# Patient Record
Sex: FEMALE | Race: WHITE | HISPANIC OR LATINO | Employment: FULL TIME | URBAN - METROPOLITAN AREA
[De-identification: names, ages, dates, MRNs, and addresses within clinical notes are randomized per-mention and may not be internally consistent; named-entity substitution may affect disease eponyms.]

---

## 2022-08-09 ENCOUNTER — OFFICE VISIT (OUTPATIENT)
Dept: OBGYN CLINIC | Facility: CLINIC | Age: 27
End: 2022-08-09
Payer: COMMERCIAL

## 2022-08-09 VITALS
DIASTOLIC BLOOD PRESSURE: 78 MMHG | SYSTOLIC BLOOD PRESSURE: 128 MMHG | BODY MASS INDEX: 25.61 KG/M2 | WEIGHT: 150 LBS | HEIGHT: 64 IN

## 2022-08-09 DIAGNOSIS — R11.0 NAUSEA: ICD-10-CM

## 2022-08-09 DIAGNOSIS — N91.2 AMENORRHEA: Primary | ICD-10-CM

## 2022-08-09 LAB — SL AMB POCT URINE HCG: POSITIVE

## 2022-08-09 PROCEDURE — 76801 OB US < 14 WKS SINGLE FETUS: CPT | Performed by: PHYSICIAN ASSISTANT

## 2022-08-09 PROCEDURE — 81025 URINE PREGNANCY TEST: CPT | Performed by: PHYSICIAN ASSISTANT

## 2022-08-09 PROCEDURE — 99204 OFFICE O/P NEW MOD 45 MIN: CPT | Performed by: PHYSICIAN ASSISTANT

## 2022-08-09 RX ORDER — ONDANSETRON 8 MG/1
8 TABLET, ORALLY DISINTEGRATING ORAL EVERY 8 HOURS PRN
Qty: 20 TABLET | Refills: 2 | Status: SHIPPED | OUTPATIENT
Start: 2022-08-09 | End: 2022-10-13

## 2022-08-09 NOTE — PROGRESS NOTES
Assessment/Plan:  - Viable IUP @ 8w6d today  - ALBERTO 23  - Continue PNV  - Call for concerns  - RTO 2 weeks for OB intake     Diagnoses and all orders for this visit:    Amenorrhea  -     POCT urine HCG  -     AMB US OB < 14 weeks single or first gestation level 1  -     Ambulatory Referral to Maternal Fetal Medicine; Future    Nausea  -     ondansetron (Zofran ODT) 8 mg disintegrating tablet; Take 1 tablet (8 mg total) by mouth every 8 (eight) hours as needed for nausea or vomiting          Subjective:      Patient ID: Amanda Marie is a 28 y o  female  Castillo Serna is a 26YO  Wf presenting to the office for pregnancy confirmation via [address]  She reports her LMP as 22, placing her at [address] today with an ALBERTO of 23  She is having nausea and has been vomiting  The following portions of the patient's history were reviewed and updated as appropriate: allergies, current medications, past family history, past medical history, past social history, past surgical history and problem list     Review of Systems   Constitutional: Negative for chills, fever and unexpected weight change  Respiratory: Negative for shortness of breath  Cardiovascular: Negative for chest pain  Gastrointestinal: Positive for nausea and vomiting  Negative for abdominal pain and diarrhea  Skin: Negative for rash  Psychiatric/Behavioral: Negative for dysphoric mood  The patient is not nervous/anxious  Objective:      /78 (BP Location: Left arm, Patient Position: Sitting, Cuff Size: Standard)   Ht 5' 4" (1 626 m)   Wt 68 kg (150 lb)   LMP 2022 (Exact Date)   BMI 25 75 kg/m²          Physical Exam  Vitals reviewed  Constitutional:       Appearance: Normal appearance  She is normal weight  HENT:      Head: Normocephalic and atraumatic  Pulmonary:      Effort: Pulmonary effort is normal    Genitourinary:     General: Normal vulva  Labia:         Right: No rash or lesion           Left: No rash or lesion  Comments: TVUS reveals IUP, yolk sac, fetal pole, +CM  CRL 2 17cm (8w6d)  FHR 187bpm  ALBERTO 03-18-23  Skin:     General: Skin is warm and dry  Neurological:      General: No focal deficit present  Mental Status: She is alert  Psychiatric:         Mood and Affect: Mood normal          Behavior: Behavior normal          Ultrasound Probe Disinfection    A transvaginal ultrasound was performed     Prior to use, disinfection was performed with High Level Disinfection Process (Trophon)  Probe serial number [id number]: [id number] was used    Ghulam Oakley PA-C  08/09/22  6:37 PM

## 2022-08-22 NOTE — PROGRESS NOTES
OB INTAKE INTERVIEW  Pt presents for OB intake  The patient was oriented to our practice and all questions were answered  Plan:  - Prenatal labs ordered  - Referral given for MFM at confirmation scan  - Reviewed Genetic testing options   CF:   SMA:   - Patient to call for concerns  - RTO 3-4 weeks for OB F/U visit and PAP/Cultures    ~Last pap: 2022 (Done w/ previous Gyn in [place], declines hx of abnormals)       OB History        1    Para        Term                AB        Living           SAB        IAB        Ectopic        Multiple        Live Births               Obstetric Comments   Menarche 13                 Last Menstrual Period:             22      Ultrasound date:    22  Estimated Date of Delivery:  3/18/23     Current Issues:  Constipation :          yes       Headaches :            occ  Cramping:                no  Spotting :                  no     Interview education  · [facility] Pregnancy Essentials reviewed and discussed   · Baby and [address] Handout  · [facility] MFM Handouts  · Discussed genetic testing  · Prenatal lab work: Scripts printed and given to pt         Prior Pregnancy Delivery Complications              History of  delivery or PPROM:   History of Shoulder Dystocia:               History of vacuum or forceps delivery:               History of 3rd/4th degree laceration:               History of  section:      Diabetes              Pregestational DM:               hx of GDM:               BMI >35:               first degree relative with type 2 diabetes:               hx of PCOS:               current metformin use:               prior hx of LGA/macrosomia:                Hypertension              Hx of chronic HTN:               hx of gestational HTN:               hx of preeclampsia, eclampsia, or HELLP syndrome:               Age 28 or older:               Multifetal gestation:  Type 1 or Type 2 DM:  Renal Disease:   Autoimmune disease (systemic lupus erythematosus, antiphospholipid antibody syndrome):   Nulliparity:   Obesity (BMI over 30):   More than 10 year pregnancy interval:   Previous IUGR, low birthweight or small for gestational age:     Immunizations:                influenza vaccine: No              Covid Vaccination: vaccinated  Discussed Tdap vaccine administration at 27-28 weeks                   There is no immunization history on file for this patient           Dental visit with last 6 months: No  PHQ-2/9 score: 0  MyChart activated (not 1418 years of age)?: Yes

## 2022-08-24 ENCOUNTER — INITIAL PRENATAL (OUTPATIENT)
Dept: OBGYN CLINIC | Facility: CLINIC | Age: 27
End: 2022-08-24

## 2022-08-24 VITALS — WEIGHT: 153.2 LBS | BODY MASS INDEX: 26.3 KG/M2

## 2022-08-24 DIAGNOSIS — Z34.01 ENCOUNTER FOR SUPERVISION OF NORMAL FIRST PREGNANCY IN FIRST TRIMESTER: Primary | ICD-10-CM

## 2022-08-24 PROCEDURE — OBC: Performed by: OBSTETRICS & GYNECOLOGY

## 2022-09-08 ENCOUNTER — LAB (OUTPATIENT)
Dept: LAB | Facility: CLINIC | Age: 27
End: 2022-09-08
Payer: COMMERCIAL

## 2022-09-08 ENCOUNTER — ROUTINE PRENATAL (OUTPATIENT)
Dept: PERINATAL CARE | Facility: OTHER | Age: 27
End: 2022-09-08
Payer: COMMERCIAL

## 2022-09-08 VITALS
HEIGHT: 64 IN | WEIGHT: 157.63 LBS | DIASTOLIC BLOOD PRESSURE: 72 MMHG | HEART RATE: 90 BPM | BODY MASS INDEX: 26.91 KG/M2 | SYSTOLIC BLOOD PRESSURE: 128 MMHG

## 2022-09-08 DIAGNOSIS — Z34.01 ENCOUNTER FOR SUPERVISION OF NORMAL FIRST PREGNANCY IN FIRST TRIMESTER: ICD-10-CM

## 2022-09-08 DIAGNOSIS — O34.81 OVARIAN CYST COMPLICATING PREGNANCY, ANTEPARTUM, FIRST TRIMESTER: ICD-10-CM

## 2022-09-08 DIAGNOSIS — Z36.9 UNSPECIFIED ANTENATAL SCREENING: ICD-10-CM

## 2022-09-08 DIAGNOSIS — Z33.1 PREGNANT STATE, INCIDENTAL: ICD-10-CM

## 2022-09-08 DIAGNOSIS — Z3A.12 12 WEEKS GESTATION OF PREGNANCY: ICD-10-CM

## 2022-09-08 DIAGNOSIS — Z36.82 ENCOUNTER FOR ANTENATAL SCREENING FOR NUCHAL TRANSLUCENCY: Primary | ICD-10-CM

## 2022-09-08 DIAGNOSIS — N83.209 OVARIAN CYST COMPLICATING PREGNANCY, ANTEPARTUM, FIRST TRIMESTER: ICD-10-CM

## 2022-09-08 LAB
ABO GROUP BLD: NORMAL
BASOPHILS # BLD AUTO: 0.04 THOUSANDS/ΜL (ref 0–0.1)
BASOPHILS NFR BLD AUTO: 0 % (ref 0–1)
BLD GP AB SCN SERPL QL: NEGATIVE
EOSINOPHIL # BLD AUTO: 0.02 THOUSAND/ΜL (ref 0–0.61)
EOSINOPHIL NFR BLD AUTO: 0 % (ref 0–6)
ERYTHROCYTE [DISTWIDTH] IN BLOOD BY AUTOMATED COUNT: 12.5 % (ref 11.6–15.1)
HBV SURFACE AG SER QL: NORMAL
HCT VFR BLD AUTO: 38 % (ref 34.8–46.1)
HCV AB SER QL: NORMAL
HGB BLD-MCNC: 13.2 G/DL (ref 11.5–15.4)
IMM GRANULOCYTES # BLD AUTO: 0.03 THOUSAND/UL (ref 0–0.2)
IMM GRANULOCYTES NFR BLD AUTO: 0 % (ref 0–2)
LYMPHOCYTES # BLD AUTO: 1.22 THOUSANDS/ΜL (ref 0.6–4.47)
LYMPHOCYTES NFR BLD AUTO: 13 % (ref 14–44)
MCH RBC QN AUTO: 30.1 PG (ref 26.8–34.3)
MCHC RBC AUTO-ENTMCNC: 34.7 G/DL (ref 31.4–37.4)
MCV RBC AUTO: 87 FL (ref 82–98)
MONOCYTES # BLD AUTO: 0.61 THOUSAND/ΜL (ref 0.17–1.22)
MONOCYTES NFR BLD AUTO: 6 % (ref 4–12)
NEUTROPHILS # BLD AUTO: 7.73 THOUSANDS/ΜL (ref 1.85–7.62)
NEUTS SEG NFR BLD AUTO: 81 % (ref 43–75)
NRBC BLD AUTO-RTO: 0 /100 WBCS
PLATELET # BLD AUTO: 271 THOUSANDS/UL (ref 149–390)
PMV BLD AUTO: 10.6 FL (ref 8.9–12.7)
RBC # BLD AUTO: 4.38 MILLION/UL (ref 3.81–5.12)
RH BLD: POSITIVE
RUBV IGG SERPL IA-ACNC: >175 IU/ML
SPECIMEN EXPIRATION DATE: NORMAL
WBC # BLD AUTO: 9.65 THOUSAND/UL (ref 4.31–10.16)

## 2022-09-08 PROCEDURE — 76813 OB US NUCHAL MEAS 1 GEST: CPT | Performed by: OBSTETRICS & GYNECOLOGY

## 2022-09-08 PROCEDURE — 86803 HEPATITIS C AB TEST: CPT

## 2022-09-08 PROCEDURE — 99203 OFFICE O/P NEW LOW 30 MIN: CPT | Performed by: OBSTETRICS & GYNECOLOGY

## 2022-09-08 PROCEDURE — 86787 VARICELLA-ZOSTER ANTIBODY: CPT

## 2022-09-08 PROCEDURE — 76801 OB US < 14 WKS SINGLE FETUS: CPT | Performed by: OBSTETRICS & GYNECOLOGY

## 2022-09-08 PROCEDURE — 80081 OBSTETRIC PANEL INC HIV TSTG: CPT

## 2022-09-08 PROCEDURE — 36415 COLL VENOUS BLD VENIPUNCTURE: CPT

## 2022-09-08 PROCEDURE — 87086 URINE CULTURE/COLONY COUNT: CPT

## 2022-09-08 NOTE — LETTER
September 8, 2022     Efrain Adams    Patient: Gail Savage   YOB: 1995   Date of Visit: 9/8/2022       Dear Dr Tito Hernandez: Thank you for referring Gail Savage to me for evaluation  Below are my notes for this consultation  If you have questions, please do not hesitate to call me  I look forward to following your patient along with you  Sincerely,        Cain Goldmann, MD        CC: No Recipients  Cain Goldmann, MD  9/7/2022  5:44 PM  Sign when Signing Visit  Please refer to the [facility] ultrasound report in Ob Procedures for additional information regarding today's visit

## 2022-09-08 NOTE — PROGRESS NOTES
Patient chose to have [facility] Non-invasive Prenatal Screen  Patient given brochure and is aware [facility] will contact patients insurance and coordinate coverage  Patient made aware she will need to respond to text message or e-mail from [employer] within 2 business days or testing will be run through insurance  Patient informed text message will come from area code  "415"  Provided [address] [place] [address] Street # [phone] and web site : [email or web address]  Pt will be going to [facility] lab for blood work to be completed   Patient was provided with an [facility] kit to have blood work drawn at any laboratory  Test tubes labeled with patient identifiers (name and date of birth)  Order and test tubes were verified with patient  [facility] packing instructions provided inside kit for when blood is drawn  Copy of lab order scanned to Epic media  Maternal Fetal Medicine will have results in approximately 7-10 business days and will call patient or notify via [address]  Patient aware viewing lab result online will reveal fetal sex If ordered  Patient verbalized understanding of all instructions and no questions at this time

## 2022-09-09 LAB
HIV 1+2 AB+HIV1 P24 AG SERPL QL IA: NORMAL
MISCELLANEOUS LAB TEST RESULT: NORMAL
RPR SER QL: NORMAL
VZV IGG SER QL IA: NORMAL

## 2022-09-11 LAB — BACTERIA UR CULT: NORMAL

## 2022-09-14 ENCOUNTER — ROUTINE PRENATAL (OUTPATIENT)
Dept: OBGYN CLINIC | Facility: CLINIC | Age: 27
End: 2022-09-14

## 2022-09-14 VITALS — DIASTOLIC BLOOD PRESSURE: 68 MMHG | BODY MASS INDEX: 26.81 KG/M2 | SYSTOLIC BLOOD PRESSURE: 122 MMHG | WEIGHT: 156.2 LBS

## 2022-09-14 DIAGNOSIS — Z34.01 ENCOUNTER FOR SUPERVISION OF NORMAL FIRST PREGNANCY IN FIRST TRIMESTER: Primary | ICD-10-CM

## 2022-09-14 PROCEDURE — 87491 CHLMYD TRACH DNA AMP PROBE: CPT | Performed by: OBSTETRICS & GYNECOLOGY

## 2022-09-14 PROCEDURE — 87591 N.GONORRHOEAE DNA AMP PROB: CPT | Performed by: OBSTETRICS & GYNECOLOGY

## 2022-09-14 PROCEDURE — PNV: Performed by: OBSTETRICS & GYNECOLOGY

## 2022-09-14 NOTE — PROGRESS NOTES
28 y o   female at 13w4d (Estimated Date of Delivery: 3/18/23) for PNV  Pre-Donnie Vitals    Flowsheet Row Most Recent Value   Prenatal Assessment    Prenatal Vitals    Blood Pressure 122/68   Weight - Scale 70 9 kg (156 lb 3 2 oz)   Urine Albumin/Glucose    Dilation/Effacement/Station    Vaginal Drainage    Edema          kg (6 lb 3 2 oz)  Reports having pap last year, will sign records release [place]  Completed labs, reviewed with patient  Normal NIPT    Planning gender reveal   Simple left ovarian cyst 2-3cm, discussed  Scheduled for Level II u/s  Ordered msAFP

## 2022-09-15 LAB
C TRACH DNA SPEC QL NAA+PROBE: NEGATIVE
N GONORRHOEA DNA SPEC QL NAA+PROBE: NEGATIVE

## 2022-10-07 ENCOUNTER — APPOINTMENT (OUTPATIENT)
Dept: LAB | Facility: CLINIC | Age: 27
End: 2022-10-07
Payer: COMMERCIAL

## 2022-10-07 DIAGNOSIS — Z34.01 ENCOUNTER FOR SUPERVISION OF NORMAL FIRST PREGNANCY IN FIRST TRIMESTER: ICD-10-CM

## 2022-10-07 PROCEDURE — 36415 COLL VENOUS BLD VENIPUNCTURE: CPT

## 2022-10-07 PROCEDURE — 82105 ALPHA-FETOPROTEIN SERUM: CPT

## 2022-10-10 LAB
2ND TRIMESTER 4 SCREEN SERPL-IMP: NORMAL
AFP ADJ MOM SERPL: 1.04
AFP INTERP AMN-IMP: NORMAL
AFP INTERP SERPL-IMP: NORMAL
AFP INTERP SERPL-IMP: NORMAL
AFP SERPL-MCNC: 40 NG/ML
AGE AT DELIVERY: 28.7 YR
GA METHOD: NORMAL
GA: 16.9 WEEKS
IDDM PATIENT QL: NO
MULTIPLE PREGNANCY: NO
NEURAL TUBE DEFECT RISK FETUS: NORMAL %

## 2022-10-13 ENCOUNTER — ROUTINE PRENATAL (OUTPATIENT)
Dept: OBGYN CLINIC | Facility: CLINIC | Age: 27
End: 2022-10-13

## 2022-10-13 VITALS — DIASTOLIC BLOOD PRESSURE: 58 MMHG | SYSTOLIC BLOOD PRESSURE: 132 MMHG | WEIGHT: 164 LBS | BODY MASS INDEX: 28.15 KG/M2

## 2022-10-13 DIAGNOSIS — Z3A.17 17 WEEKS GESTATION OF PREGNANCY: ICD-10-CM

## 2022-10-13 DIAGNOSIS — Z34.02 ENCOUNTER FOR SUPERVISION OF NORMAL FIRST PREGNANCY IN SECOND TRIMESTER: Primary | ICD-10-CM

## 2022-10-13 PROBLEM — Z34.90 SUPERVISION OF NORMAL PREGNANCY: Status: ACTIVE | Noted: 2022-10-13

## 2022-10-13 PROCEDURE — PNV: Performed by: OBSTETRICS & GYNECOLOGY

## 2022-10-13 NOTE — PROGRESS NOTES
This is a 28 y o   at 17w5d who presents for return OB visit  No complaints  Nausea improving though still sometimes present  BP: 132/58 TWlb    Normal AFP   Level 2 scheduled  F/up 4 wks

## 2022-11-01 NOTE — PATIENT INSTRUCTIONS
Thank you for choosing us for your  care today  If you have any questions about your ultrasound or care, please do not hesitate to contact us or your primary obstetrician  Some general instructions for your pregnancy are:    Protect against coronavirus: get vaccinated - pregnant women are increased risk of severe COVID  Notify your primary care doctor if you have any symptoms  Exercise: Aim for 22 minutes per day (150 minutes per week) of regular exercise  Walking is great! Nutrition: aim for calcium-rich and iron-rich foods as well as healthy sources of protein  Learn about Preeclampsia: preeclampsia is a common, serious high blood pressure complication in pregnancy  A blood pressure of 183PNED (systolic or top number) or 20WRFM (diastolic or bottom number) is not normal and needs evaluation by your doctor  Aspirin is sometimes prescribed in early pregnancy to prevent preeclampsia in women with risk factors - ask your obstetrician if you should be on this medication  If you smoke, try to reduce how many cigarettes you smoke or try to quit completely  Do not vape  Other warning signs to watch out for in pregnancy or postpartum: chest pain, obstructed breathing or shortness of breath, seizures, thoughts of hurting yourself or your baby, bleeding, a painful or swollen leg, fever, or headache (see [facility] POST-BIRTH Warning Signs campaign)  If these happen call 911  Itching is also not normal in pregnancy and if you experience this, especially over your hands and feet, potentially worse at night, notify your doctors

## 2022-11-03 ENCOUNTER — ROUTINE PRENATAL (OUTPATIENT)
Dept: PERINATAL CARE | Facility: OTHER | Age: 27
End: 2022-11-03

## 2022-11-03 VITALS
HEART RATE: 99 BPM | BODY MASS INDEX: 28.72 KG/M2 | HEIGHT: 64 IN | WEIGHT: 168.21 LBS | DIASTOLIC BLOOD PRESSURE: 66 MMHG | SYSTOLIC BLOOD PRESSURE: 132 MMHG

## 2022-11-03 DIAGNOSIS — Z3A.20 20 WEEKS GESTATION OF PREGNANCY: ICD-10-CM

## 2022-11-03 DIAGNOSIS — Z36.86 ENCOUNTER FOR ANTENATAL SCREENING FOR CERVICAL LENGTH: ICD-10-CM

## 2022-11-03 DIAGNOSIS — O34.82 OVARIAN CYST DURING PREGNANCY IN SECOND TRIMESTER: Primary | ICD-10-CM

## 2022-11-03 DIAGNOSIS — N83.209 OVARIAN CYST DURING PREGNANCY IN SECOND TRIMESTER: Primary | ICD-10-CM

## 2022-11-03 DIAGNOSIS — Z36.3 ENCOUNTER FOR ANTENATAL SCREENING FOR MALFORMATION: ICD-10-CM

## 2022-11-03 NOTE — PROGRESS NOTES
[address]: Ms Denisse Daniel was seen today for anatomic survey and cervical length screening ultrasound  See ultrasound report under "OB Procedures" tab  The time spent on this established patient on the encounter date included 4 minutes previsit service time reviewing records and precharting, 5 minutes face-to-face service time counseling regarding results and coordinating care, and  4 minutes charting, totalling 13 minutes    Please don't hesitate to contact our office with any concerns or questions   -Juan Ramon Aguiar

## 2022-11-08 ENCOUNTER — ROUTINE PRENATAL (OUTPATIENT)
Dept: OBGYN CLINIC | Facility: CLINIC | Age: 27
End: 2022-11-08

## 2022-11-08 VITALS — BODY MASS INDEX: 28.49 KG/M2 | WEIGHT: 166 LBS

## 2022-11-08 DIAGNOSIS — Z3A.21 21 WEEKS GESTATION OF PREGNANCY: ICD-10-CM

## 2022-11-08 DIAGNOSIS — Z34.02 ENCOUNTER FOR SUPERVISION OF NORMAL FIRST PREGNANCY IN SECOND TRIMESTER: Primary | ICD-10-CM

## 2022-11-08 NOTE — PROGRESS NOTES
28 y o   female at 21w3d (Estimated Date of Delivery: 3/18/23) for PNV  Pre-Donnie Vitals    Flowsheet Row Most Recent Value   Prenatal Assessment    Fetal Heart Rate 160   Prenatal Vitals    Weight - Scale 75 3 kg (166 lb)   Urine Albumin/Glucose    Dilation/Effacement/Station    Vaginal Drainage    Edema          kg (16 lb)  Denies contractions/lof or bleeding  Feeling movement  Reassuring anatomy scan - no further US scheduled  It's a BOY! RTO in 4 weeks   Urine neg protein/glucose

## 2022-12-06 ENCOUNTER — ROUTINE PRENATAL (OUTPATIENT)
Dept: OBGYN CLINIC | Facility: CLINIC | Age: 27
End: 2022-12-06

## 2022-12-06 VITALS — BODY MASS INDEX: 30.21 KG/M2 | SYSTOLIC BLOOD PRESSURE: 126 MMHG | WEIGHT: 176 LBS | DIASTOLIC BLOOD PRESSURE: 70 MMHG

## 2022-12-06 DIAGNOSIS — Z3A.25 25 WEEKS GESTATION OF PREGNANCY: ICD-10-CM

## 2022-12-06 DIAGNOSIS — N83.209 OVARIAN CYST COMPLICATING PREGNANCY, ANTEPARTUM, SECOND TRIMESTER: ICD-10-CM

## 2022-12-06 DIAGNOSIS — Z34.02 ENCOUNTER FOR SUPERVISION OF NORMAL FIRST PREGNANCY IN SECOND TRIMESTER: Primary | ICD-10-CM

## 2022-12-06 DIAGNOSIS — O34.82 OVARIAN CYST COMPLICATING PREGNANCY, ANTEPARTUM, SECOND TRIMESTER: ICD-10-CM

## 2022-12-06 NOTE — PROGRESS NOTES
28 y o    female at 46  wga for PNV  BP : 126/70  TW  Feeling well    No complaints  Gave 28 week labs  Recommended flu and covid vaccine  Reviewed PTL precautions  F/u 4 weeks

## 2022-12-15 ENCOUNTER — LAB (OUTPATIENT)
Dept: LAB | Facility: CLINIC | Age: 27
End: 2022-12-15

## 2022-12-15 DIAGNOSIS — N83.209 OVARIAN CYST COMPLICATING PREGNANCY, ANTEPARTUM, SECOND TRIMESTER: ICD-10-CM

## 2022-12-15 DIAGNOSIS — O34.82 OVARIAN CYST COMPLICATING PREGNANCY, ANTEPARTUM, SECOND TRIMESTER: ICD-10-CM

## 2022-12-15 LAB
ERYTHROCYTE [DISTWIDTH] IN BLOOD BY AUTOMATED COUNT: 13.1 % (ref 11.6–15.1)
GLUCOSE 1H P 50 G GLC PO SERPL-MCNC: 111 MG/DL (ref 40–134)
HCT VFR BLD AUTO: 34.7 % (ref 34.8–46.1)
HGB BLD-MCNC: 11.3 G/DL (ref 11.5–15.4)
MCH RBC QN AUTO: 30.3 PG (ref 26.8–34.3)
MCHC RBC AUTO-ENTMCNC: 32.6 G/DL (ref 31.4–37.4)
MCV RBC AUTO: 93 FL (ref 82–98)
PLATELET # BLD AUTO: 265 THOUSANDS/UL (ref 149–390)
PMV BLD AUTO: 10.3 FL (ref 8.9–12.7)
RBC # BLD AUTO: 3.73 MILLION/UL (ref 3.81–5.12)
WBC # BLD AUTO: 10.16 THOUSAND/UL (ref 4.31–10.16)

## 2022-12-16 LAB — RPR SER QL: NORMAL

## 2022-12-21 ENCOUNTER — ROUTINE PRENATAL (OUTPATIENT)
Dept: OBGYN CLINIC | Facility: CLINIC | Age: 27
End: 2022-12-21

## 2022-12-21 VITALS — BODY MASS INDEX: 31.03 KG/M2 | DIASTOLIC BLOOD PRESSURE: 80 MMHG | WEIGHT: 180.8 LBS | SYSTOLIC BLOOD PRESSURE: 126 MMHG

## 2022-12-21 DIAGNOSIS — Z34.02 ENCOUNTER FOR SUPERVISION OF NORMAL FIRST PREGNANCY IN SECOND TRIMESTER: Primary | ICD-10-CM

## 2022-12-21 DIAGNOSIS — Z3A.27 27 WEEKS GESTATION OF PREGNANCY: ICD-10-CM

## 2022-12-21 NOTE — PROGRESS NOTES
Patient states she is feeling well, no complaints  Has questions regarding kick counts  Does have questions regarding raspberry leaf tea and when she could starting drinking the tea and how often  Patient would like Tdap at her next visit       Urine neg/neg

## 2022-12-21 NOTE — PROGRESS NOTES
This is a 28 y o   at 29w1d who presents for return OB visit  No complaints  Denies contractions, leakage, bleeding  Endorses fetal movement  28 wk labs reviewed  TDAP offered, planning next appt  [address] reviewed  Consent signed  Full code   OK with blood transfusion

## 2023-01-19 ENCOUNTER — ROUTINE PRENATAL (OUTPATIENT)
Dept: OBGYN CLINIC | Facility: CLINIC | Age: 28
End: 2023-01-19

## 2023-01-19 VITALS — SYSTOLIC BLOOD PRESSURE: 124 MMHG | WEIGHT: 194 LBS | DIASTOLIC BLOOD PRESSURE: 68 MMHG | BODY MASS INDEX: 33.3 KG/M2

## 2023-01-19 DIAGNOSIS — Z3A.31 31 WEEKS GESTATION OF PREGNANCY: ICD-10-CM

## 2023-01-19 DIAGNOSIS — Z34.03 ENCOUNTER FOR SUPERVISION OF NORMAL FIRST PREGNANCY IN THIRD TRIMESTER: Primary | ICD-10-CM

## 2023-01-19 DIAGNOSIS — Z23 NEED FOR TDAP VACCINATION: ICD-10-CM

## 2023-01-19 NOTE — PROGRESS NOTES
This is a 28 y o   at 31w5d who presents for return OB visit  Having hip/low back pain and soreness by the end of the day especially  Denies contractions, leakage, bleeding   Endorses fetal movement   BP: 124/68 TWlb    Tdap given today  Reviewed stretches and exercises to help with hip/back pain  F/up 2 wks

## 2023-02-01 ENCOUNTER — ROUTINE PRENATAL (OUTPATIENT)
Dept: OBGYN CLINIC | Facility: CLINIC | Age: 28
End: 2023-02-01

## 2023-02-01 VITALS — SYSTOLIC BLOOD PRESSURE: 124 MMHG | WEIGHT: 196.6 LBS | DIASTOLIC BLOOD PRESSURE: 76 MMHG | BODY MASS INDEX: 33.75 KG/M2

## 2023-02-01 DIAGNOSIS — O26.03 EXCESSIVE WEIGHT GAIN IN PREGNANCY IN THIRD TRIMESTER: Primary | ICD-10-CM

## 2023-02-01 DIAGNOSIS — Z3A.33 33 WEEKS GESTATION OF PREGNANCY: ICD-10-CM

## 2023-02-01 NOTE — PROGRESS NOTES
Patient states for the last 2 weeks she has noticed that her urine is foamy/ bubbly  She also has questions regarding a growth scan

## 2023-02-02 LAB
DME PARACHUTE DELIVERY DATE REQUESTED: NORMAL
DME PARACHUTE ITEM DESCRIPTION: NORMAL
DME PARACHUTE ORDER STATUS: NORMAL
DME PARACHUTE SUPPLIER NAME: NORMAL
DME PARACHUTE SUPPLIER PHONE: NORMAL

## 2023-02-02 NOTE — PROGRESS NOTES
28 y o   female at 33w4d (Estimated Date of Delivery: 3/18/23) for PNV      Pre-Donnie Vitals    Flowsheet Row Most Recent Value   Prenatal Assessment    Movement Present   Prenatal Vitals    Blood Pressure 124/76   Weight - Scale 89 2 kg (196 lb 9 6 oz)   Urine Albumin/Glucose    Dilation/Effacement/Station    Vaginal Drainage    Edema         TW 1 kg (46 lb 9 6 oz)  Will order growth u/s for excessive maternal weight gain   Labs reviewed  Discussed GBS testing at 36 weeks

## 2023-02-09 LAB
DME PARACHUTE DELIVERY DATE ACTUAL: NORMAL
DME PARACHUTE DELIVERY DATE REQUESTED: NORMAL
DME PARACHUTE ITEM DESCRIPTION: NORMAL
DME PARACHUTE ORDER STATUS: NORMAL
DME PARACHUTE SUPPLIER NAME: NORMAL
DME PARACHUTE SUPPLIER PHONE: NORMAL